# Patient Record
Sex: MALE | Race: WHITE | NOT HISPANIC OR LATINO | Employment: UNEMPLOYED | URBAN - METROPOLITAN AREA
[De-identification: names, ages, dates, MRNs, and addresses within clinical notes are randomized per-mention and may not be internally consistent; named-entity substitution may affect disease eponyms.]

---

## 2018-09-06 ENCOUNTER — PREP FOR SURGERY (OUTPATIENT)
Dept: OTHER | Facility: HOSPITAL | Age: 43
End: 2018-09-06

## 2018-09-06 ENCOUNTER — OFFICE VISIT (OUTPATIENT)
Dept: NEUROSURGERY | Facility: CLINIC | Age: 43
End: 2018-09-06

## 2018-09-06 VITALS
OXYGEN SATURATION: 99 % | BODY MASS INDEX: 32.93 KG/M2 | HEIGHT: 70 IN | RESPIRATION RATE: 18 BRPM | TEMPERATURE: 97.8 F | WEIGHT: 230 LBS

## 2018-09-06 DIAGNOSIS — M53.9 MULTILEVEL DEGENERATIVE DISC DISEASE: ICD-10-CM

## 2018-09-06 DIAGNOSIS — M51.36 BULGING LUMBAR DISC: Primary | ICD-10-CM

## 2018-09-06 DIAGNOSIS — M54.31 SCIATICA OF RIGHT SIDE: Primary | ICD-10-CM

## 2018-09-06 PROCEDURE — 99204 OFFICE O/P NEW MOD 45 MIN: CPT | Performed by: NEUROLOGICAL SURGERY

## 2018-09-06 RX ORDER — CEPHALEXIN 500 MG/1
CAPSULE ORAL
COMMUNITY
Start: 2018-08-29 | End: 2018-09-19 | Stop reason: HOSPADM

## 2018-09-06 RX ORDER — GABAPENTIN 300 MG/1
CAPSULE ORAL
Refills: 0 | COMMUNITY
Start: 2018-08-10 | End: 2018-11-20 | Stop reason: DRUGHIGH

## 2018-09-06 RX ORDER — CHLORHEXIDINE GLUCONATE 4 G/100ML
SOLUTION TOPICAL
Qty: 120 ML | Refills: 0 | Status: SHIPPED | OUTPATIENT
Start: 2018-09-06 | End: 2018-09-19 | Stop reason: HOSPADM

## 2018-09-06 RX ORDER — HYDROCODONE BITARTRATE AND ACETAMINOPHEN 7.5; 325 MG/1; MG/1
1 TABLET ORAL ONCE
Status: CANCELLED | OUTPATIENT
Start: 2018-09-06 | End: 2018-09-06

## 2018-09-06 RX ORDER — FAMOTIDINE 20 MG/1
20 TABLET, FILM COATED ORAL
Status: CANCELLED | OUTPATIENT
Start: 2018-09-06

## 2018-09-06 RX ORDER — OXYCODONE AND ACETAMINOPHEN 7.5; 325 MG/1; MG/1
TABLET ORAL
Refills: 0 | COMMUNITY
Start: 2018-08-10 | End: 2018-09-19 | Stop reason: HOSPADM

## 2018-09-06 RX ORDER — IBUPROFEN 800 MG/1
800 TABLET ORAL ONCE
Status: CANCELLED | OUTPATIENT
Start: 2018-09-06 | End: 2018-09-06

## 2018-09-06 RX ORDER — LISINOPRIL 20 MG/1
TABLET ORAL
COMMUNITY
Start: 2018-08-16

## 2018-09-06 RX ORDER — TOPIRAMATE 50 MG/1
50 TABLET, FILM COATED ORAL 2 TIMES DAILY
Refills: 5 | COMMUNITY
Start: 2018-08-10 | End: 2018-11-20

## 2018-09-06 RX ORDER — CEFAZOLIN SODIUM 2 G/100ML
2 INJECTION, SOLUTION INTRAVENOUS ONCE
Status: CANCELLED | OUTPATIENT
Start: 2018-09-06 | End: 2018-09-06

## 2018-09-06 RX ORDER — ACETAMINOPHEN 325 MG/1
650 TABLET ORAL ONCE
Status: CANCELLED | OUTPATIENT
Start: 2018-09-06 | End: 2018-09-06

## 2018-09-06 RX ORDER — DULOXETIN HYDROCHLORIDE 30 MG/1
CAPSULE, DELAYED RELEASE ORAL
COMMUNITY
Start: 2018-08-31 | End: 2018-11-20

## 2018-09-06 NOTE — PROGRESS NOTES
NAME: DEVON HERRMANN   DOS: 2018  : 1975  PCP: Carl Mercer MD    Chief Complaint:  Back pain and right thigh pain  Chief Complaint   Patient presents with   • Back Pain   • R-leg pain       History of Present Illness:  43 y.o. male   This is a 43-year-old with a bit of a complex history he has a history of being a  and presented with right-sided anterior thigh pain over a year and a half ago he's been on pain medicine prior to this for a history of chronic back pain.  He saw Dr. Morris to defer any surgical intervention he went and saw an interventional pain management and did injections that produce the symptoms of pain in the right anterior thigh and sciatica this pain was refractory for the course of months to years and then he eventually hooked up and got a lumbar spinal stimulator that provided temporary relief of his pain.  His pain then became worse in his right leg radiates in an L3 distribution and he had the stimulator removed followed by an appendectomy for a mass in his appendix.  He has current daily intractable pain it's in an L3 distribution and it's very consistent he's here for evaluation    PMHX  Allergies:  No Known Allergies  Medications    Current Outpatient Prescriptions:   •  cephalexin (KEFLEX) 500 MG capsule, , Disp: , Rfl:   •  DULoxetine (CYMBALTA) 30 MG capsule, , Disp: , Rfl:   •  gabapentin (NEURONTIN) 300 MG capsule, TAKE 2 CAPSULES BY MOUTH THREE TIMES DAILY LAST TILL 18, Disp: , Rfl: 0  •  lisinopril (PRINIVIL,ZESTRIL) 20 MG tablet, , Disp: , Rfl:   •  oxyCODONE-acetaminophen (PERCOCET) 7.5-325 MG per tablet, TAKE 1 TABLET BY MOUTH THREE TIMES DAILY LAST TILL 18, Disp: , Rfl: 0  •  topiramate (TOPAMAX) 50 MG tablet, Take 50 mg by mouth 2 (Two) Times a Day., Disp: , Rfl: 5  Past Medical History:  Past Medical History:   Diagnosis Date   • Hypertension    • Low back pain      Past Surgical History:  Past Surgical History:   Procedure  Laterality Date   • APPENDECTOMY  2018   • SHOULDER ARTHROSCOPY Left 2015   • SPINAL CORD STIMULATOR IMPLANT  2016    Removed 2018 Dr. Cyril Harman     Social Hx:  Social History   Substance Use Topics   • Smoking status: Never Smoker   • Smokeless tobacco: Never Used   • Alcohol use No     Family Hx:  Family History   Problem Relation Age of Onset   • Family history unknown: Yes     Review of Systems:        Review of Systems   Constitutional: Positive for activity change, appetite change and fatigue. Negative for chills, diaphoresis, fever and unexpected weight change.   HENT: Negative for congestion, dental problem, drooling, ear discharge, ear pain, facial swelling, hearing loss, mouth sores, nosebleeds, postnasal drip, rhinorrhea, sinus pressure, sneezing, sore throat, tinnitus, trouble swallowing and voice change.    Eyes: Positive for visual disturbance. Negative for photophobia, pain, discharge, redness and itching.   Respiratory: Negative for apnea, cough, choking, chest tightness, shortness of breath, wheezing and stridor.    Cardiovascular: Negative for chest pain, palpitations and leg swelling.   Gastrointestinal: Positive for diarrhea. Negative for abdominal distention, abdominal pain, anal bleeding, blood in stool, constipation, nausea, rectal pain and vomiting.   Endocrine: Negative for cold intolerance, heat intolerance, polydipsia, polyphagia and polyuria.   Genitourinary: Negative for decreased urine volume, difficulty urinating, dysuria, enuresis, flank pain, frequency, genital sores, hematuria and urgency.   Musculoskeletal: Positive for arthralgias, back pain, myalgias and neck pain. Negative for gait problem, joint swelling and neck stiffness.   Skin: Negative for color change, pallor, rash and wound.   Allergic/Immunologic: Negative for environmental allergies, food allergies and immunocompromised state.   Neurological: Positive for weakness and numbness. Negative for dizziness, tremors,  seizures, syncope, facial asymmetry, speech difficulty, light-headedness and headaches.   Hematological: Negative for adenopathy. Does not bruise/bleed easily.   Psychiatric/Behavioral: Positive for sleep disturbance. Negative for agitation, behavioral problems, confusion, decreased concentration, dysphoric mood, hallucinations, self-injury and suicidal ideas. The patient is not nervous/anxious and is not hyperactive.    All other systems reviewed and are negative.       I have reviewed this note template and all pertinent parts of the review of systems social, family history, surgical history and medication list    Physical Examination:  Vitals:    09/06/18 1352   Resp: 18   Temp: 97.8 °F (36.6 °C)   SpO2: 99%      General Appearance:   Well developed, well nourished, well groomed, alert, and cooperative.  He appears very uncomfortable  Neurological examination:  Neurologic Exam  Vital signs were reviewed and documented in the chart  Patient appeared in good neurologic function with normal comprehension fluent speech  Mood and affect are normal  Sense of smell deferred    Pupils symmetric equally reactive funduscopic exam not visualized   Visual fields intact to confrontation  Extraocular movements intact  Face motor function is symmetric  Facial sensations normal  Hearing intact to finger rub hearing intact to finger rub  Tongue is midline  Palate symmetric  Swallowing normal  Shoulder shrug normal    Muscle bulk and tone normal  5 out of 5 strength no motor drift  Gait normal intact pain limited  Negative Romberg  No clonus long tract signs or myelopathy  He is very uncomfortable  Reflexes symmetric  No edema noted and extremities skin appears normal  Decreased light touch in the right anterior thigh  Straight leg raise sign positive on the right  No signs of intrinsic hip dysfunction  Back is without any lesions or abnormality  Feet are warm and well perfused        Review of Imaging/DATA:  Reviewed his MRIs  very carefully he has a far lateral disc at L3 4 he's got multilevel degenerative disc disease of thoracic spine shows a central disc bulge  Diagnoses/Plan:    Mr. Unger is a 43 y.o. male   I discussed within the treatment options I Splane the risks benefits and etc. I think he needs exploration of his right L3 nerve from a far lateral approach I explained the risks benefits and more importantly I tempered expectations for an excellent outcome explaining treatment for chronic sciatic pain is difficult.  The plan will be that surgery is ineffective in alleviating his thigh pain we will contemplate placement of a paddle stimulator at a different time I explained the risks benefits and expected outcome we will make arrangements for right L3 4 far lateral discectomy

## 2018-09-07 PROBLEM — M54.31 SCIATICA OF RIGHT SIDE: Status: ACTIVE | Noted: 2018-09-07

## 2018-09-17 ENCOUNTER — APPOINTMENT (OUTPATIENT)
Dept: PREADMISSION TESTING | Facility: HOSPITAL | Age: 43
End: 2018-09-17

## 2018-09-17 VITALS — BODY MASS INDEX: 33.14 KG/M2 | WEIGHT: 223.77 LBS | HEIGHT: 69 IN

## 2018-09-17 DIAGNOSIS — M54.31 SCIATICA OF RIGHT SIDE: ICD-10-CM

## 2018-09-17 LAB
ANION GAP SERPL CALCULATED.3IONS-SCNC: 11 MMOL/L (ref 3–11)
BUN BLD-MCNC: 14 MG/DL (ref 9–23)
BUN/CREAT SERPL: 18.2 (ref 7–25)
CALCIUM SPEC-SCNC: 9.8 MG/DL (ref 8.7–10.4)
CHLORIDE SERPL-SCNC: 105 MMOL/L (ref 99–109)
CO2 SERPL-SCNC: 24 MMOL/L (ref 20–31)
CREAT BLD-MCNC: 0.77 MG/DL (ref 0.6–1.3)
DEPRECATED RDW RBC AUTO: 41.1 FL (ref 37–54)
ERYTHROCYTE [DISTWIDTH] IN BLOOD BY AUTOMATED COUNT: 13 % (ref 11.3–14.5)
GFR SERPL CREATININE-BSD FRML MDRD: 110 ML/MIN/1.73
GLUCOSE BLD-MCNC: 122 MG/DL (ref 70–100)
HCT VFR BLD AUTO: 44.8 % (ref 38.9–50.9)
HGB BLD-MCNC: 15.8 G/DL (ref 13.1–17.5)
MCH RBC QN AUTO: 30.4 PG (ref 27–31)
MCHC RBC AUTO-ENTMCNC: 35.3 G/DL (ref 32–36)
MCV RBC AUTO: 86.2 FL (ref 80–99)
MRSA DNA SPEC QL NAA+PROBE: NEGATIVE
PLATELET # BLD AUTO: 293 10*3/MM3 (ref 150–450)
PMV BLD AUTO: 10.1 FL (ref 6–12)
POTASSIUM BLD-SCNC: 4.2 MMOL/L (ref 3.5–5.5)
RBC # BLD AUTO: 5.2 10*6/MM3 (ref 4.2–5.76)
SODIUM BLD-SCNC: 140 MMOL/L (ref 132–146)
WBC NRBC COR # BLD: 10.39 10*3/MM3 (ref 3.5–10.8)

## 2018-09-17 PROCEDURE — 93005 ELECTROCARDIOGRAM TRACING: CPT

## 2018-09-17 PROCEDURE — 93010 ELECTROCARDIOGRAM REPORT: CPT | Performed by: INTERNAL MEDICINE

## 2018-09-17 PROCEDURE — 85027 COMPLETE CBC AUTOMATED: CPT | Performed by: ANESTHESIOLOGY

## 2018-09-17 PROCEDURE — 87641 MR-STAPH DNA AMP PROBE: CPT | Performed by: ANESTHESIOLOGY

## 2018-09-17 PROCEDURE — 36415 COLL VENOUS BLD VENIPUNCTURE: CPT

## 2018-09-17 PROCEDURE — 80048 BASIC METABOLIC PNL TOTAL CA: CPT | Performed by: NEUROLOGICAL SURGERY

## 2018-09-17 RX ORDER — DULOXETIN HYDROCHLORIDE 60 MG/1
60 CAPSULE, DELAYED RELEASE ORAL NIGHTLY
COMMUNITY

## 2018-09-17 RX ORDER — GABAPENTIN 400 MG/1
400 CAPSULE ORAL NIGHTLY
COMMUNITY
End: 2018-11-20 | Stop reason: DRUGHIGH

## 2018-09-17 RX ORDER — TOPIRAMATE 100 MG/1
100 TABLET, FILM COATED ORAL NIGHTLY
COMMUNITY

## 2018-09-17 RX ORDER — OXYCODONE AND ACETAMINOPHEN 10; 325 MG/1; MG/1
1 TABLET ORAL EVERY 6 HOURS PRN
COMMUNITY
End: 2019-01-14

## 2018-09-18 ENCOUNTER — ANESTHESIA EVENT (OUTPATIENT)
Dept: PERIOP | Facility: HOSPITAL | Age: 43
End: 2018-09-18

## 2018-09-18 RX ORDER — FAMOTIDINE 20 MG/1
20 TABLET, FILM COATED ORAL
Status: CANCELLED | OUTPATIENT
Start: 2018-09-18

## 2018-09-19 ENCOUNTER — ANESTHESIA (OUTPATIENT)
Dept: PERIOP | Facility: HOSPITAL | Age: 43
End: 2018-09-19

## 2018-09-19 ENCOUNTER — APPOINTMENT (OUTPATIENT)
Dept: GENERAL RADIOLOGY | Facility: HOSPITAL | Age: 43
End: 2018-09-19

## 2018-09-19 ENCOUNTER — HOSPITAL ENCOUNTER (OUTPATIENT)
Facility: HOSPITAL | Age: 43
Discharge: HOME | End: 2018-09-19
Attending: NEUROLOGICAL SURGERY | Admitting: ANESTHESIOLOGY

## 2018-09-19 VITALS
HEIGHT: 69 IN | HEART RATE: 76 BPM | RESPIRATION RATE: 18 BRPM | DIASTOLIC BLOOD PRESSURE: 78 MMHG | OXYGEN SATURATION: 95 % | BODY MASS INDEX: 33.03 KG/M2 | WEIGHT: 223 LBS | SYSTOLIC BLOOD PRESSURE: 108 MMHG | TEMPERATURE: 97.3 F

## 2018-09-19 DIAGNOSIS — M54.31 SCIATICA OF RIGHT SIDE: ICD-10-CM

## 2018-09-19 PROBLEM — M54.30 SCIATICA: Status: ACTIVE | Noted: 2018-09-19

## 2018-09-19 PROCEDURE — 25010000002 ONDANSETRON PER 1 MG: Performed by: NURSE ANESTHETIST, CERTIFIED REGISTERED

## 2018-09-19 PROCEDURE — 25010000002 FENTANYL CITRATE (PF) 100 MCG/2ML SOLUTION: Performed by: NURSE ANESTHETIST, CERTIFIED REGISTERED

## 2018-09-19 PROCEDURE — 63056 DECOMPRESS SPINAL CORD LMBR: CPT | Performed by: NEUROLOGICAL SURGERY

## 2018-09-19 PROCEDURE — 25010000002 DEXAMETHASONE SODIUM PHOSPHATE 10 MG/ML SOLUTION: Performed by: NEUROLOGICAL SURGERY

## 2018-09-19 PROCEDURE — 25010000002 MIDAZOLAM PER 1 MG: Performed by: NURSE ANESTHETIST, CERTIFIED REGISTERED

## 2018-09-19 PROCEDURE — 25010000002 PHENYLEPHRINE PER 1 ML: Performed by: NURSE ANESTHETIST, CERTIFIED REGISTERED

## 2018-09-19 PROCEDURE — 25010000002 DEXAMETHASONE PER 1 MG: Performed by: NURSE ANESTHETIST, CERTIFIED REGISTERED

## 2018-09-19 PROCEDURE — 63056 DECOMPRESS SPINAL CORD LMBR: CPT | Performed by: PHYSICIAN ASSISTANT

## 2018-09-19 PROCEDURE — 25010000002 DEXAMETHASONE SODIUM PHOSPHATE 10 MG/ML SOLUTION 1 ML VIAL: Performed by: NURSE ANESTHETIST, CERTIFIED REGISTERED

## 2018-09-19 PROCEDURE — 25010000002 PROPOFOL 10 MG/ML EMULSION: Performed by: NURSE ANESTHETIST, CERTIFIED REGISTERED

## 2018-09-19 PROCEDURE — 76001 HC FLUORO GREATER THAN 1 HOUR: CPT

## 2018-09-19 PROCEDURE — 25010000002 HYDROMORPHONE PER 4 MG: Performed by: NURSE ANESTHETIST, CERTIFIED REGISTERED

## 2018-09-19 PROCEDURE — 76000 FLUOROSCOPY <1 HR PHYS/QHP: CPT

## 2018-09-19 PROCEDURE — 25010000003 CEFAZOLIN IN DEXTROSE 2-4 GM/100ML-% SOLUTION: Performed by: NEUROLOGICAL SURGERY

## 2018-09-19 PROCEDURE — 25010000002 NEOSTIGMINE 10 MG/10ML SOLUTION: Performed by: NURSE ANESTHETIST, CERTIFIED REGISTERED

## 2018-09-19 PROCEDURE — 25010000002 BUPRENORPHINE PER 0.1 MG: Performed by: NURSE ANESTHETIST, CERTIFIED REGISTERED

## 2018-09-19 RX ORDER — FENTANYL CITRATE 50 UG/ML
INJECTION, SOLUTION INTRAMUSCULAR; INTRAVENOUS AS NEEDED
Status: DISCONTINUED | OUTPATIENT
Start: 2018-09-19 | End: 2018-09-19 | Stop reason: SURG

## 2018-09-19 RX ORDER — ACETAMINOPHEN 325 MG/1
650 TABLET ORAL ONCE
Status: COMPLETED | OUTPATIENT
Start: 2018-09-19 | End: 2018-09-19

## 2018-09-19 RX ORDER — CEFAZOLIN SODIUM 2 G/100ML
2 INJECTION, SOLUTION INTRAVENOUS ONCE
Status: COMPLETED | OUTPATIENT
Start: 2018-09-19 | End: 2018-09-19

## 2018-09-19 RX ORDER — DEXAMETHASONE SODIUM PHOSPHATE 10 MG/ML
INJECTION INTRAMUSCULAR; INTRAVENOUS AS NEEDED
Status: DISCONTINUED | OUTPATIENT
Start: 2018-09-19 | End: 2018-09-19 | Stop reason: SURG

## 2018-09-19 RX ORDER — HYDROMORPHONE HYDROCHLORIDE 1 MG/ML
0.5 INJECTION, SOLUTION INTRAMUSCULAR; INTRAVENOUS; SUBCUTANEOUS
Status: DISCONTINUED | OUTPATIENT
Start: 2018-09-19 | End: 2018-09-19 | Stop reason: HOSPADM

## 2018-09-19 RX ORDER — FAMOTIDINE 20 MG/1
20 TABLET, FILM COATED ORAL
Status: COMPLETED | OUTPATIENT
Start: 2018-09-19 | End: 2018-09-19

## 2018-09-19 RX ORDER — METHOCARBAMOL 500 MG/1
750 TABLET, FILM COATED ORAL 3 TIMES DAILY PRN
Qty: 135 TABLET | Refills: 0 | Status: SHIPPED | OUTPATIENT
Start: 2018-09-19

## 2018-09-19 RX ORDER — ONDANSETRON 2 MG/ML
INJECTION INTRAMUSCULAR; INTRAVENOUS AS NEEDED
Status: DISCONTINUED | OUTPATIENT
Start: 2018-09-19 | End: 2018-09-19 | Stop reason: SURG

## 2018-09-19 RX ORDER — SODIUM CHLORIDE 0.9 % (FLUSH) 0.9 %
1-10 SYRINGE (ML) INJECTION AS NEEDED
Status: DISCONTINUED | OUTPATIENT
Start: 2018-09-19 | End: 2018-09-19 | Stop reason: HOSPADM

## 2018-09-19 RX ORDER — SODIUM CHLORIDE, SODIUM LACTATE, POTASSIUM CHLORIDE, CALCIUM CHLORIDE 600; 310; 30; 20 MG/100ML; MG/100ML; MG/100ML; MG/100ML
9 INJECTION, SOLUTION INTRAVENOUS CONTINUOUS PRN
Status: DISCONTINUED | OUTPATIENT
Start: 2018-09-19 | End: 2018-09-19 | Stop reason: HOSPADM

## 2018-09-19 RX ORDER — FENTANYL CITRATE 50 UG/ML
50 INJECTION, SOLUTION INTRAMUSCULAR; INTRAVENOUS
Status: DISCONTINUED | OUTPATIENT
Start: 2018-09-19 | End: 2018-09-19 | Stop reason: HOSPADM

## 2018-09-19 RX ORDER — DEXAMETHASONE SODIUM PHOSPHATE 4 MG/ML
8 VIAL (ML) INJECTION ONCE AS NEEDED
Status: DISCONTINUED | OUTPATIENT
Start: 2018-09-19 | End: 2018-09-19 | Stop reason: HOSPADM

## 2018-09-19 RX ORDER — HYDROCODONE BITARTRATE AND ACETAMINOPHEN 7.5; 325 MG/1; MG/1
1 TABLET ORAL ONCE
Status: COMPLETED | OUTPATIENT
Start: 2018-09-19 | End: 2018-09-19

## 2018-09-19 RX ORDER — ONDANSETRON 2 MG/ML
4 INJECTION INTRAMUSCULAR; INTRAVENOUS ONCE AS NEEDED
Status: DISCONTINUED | OUTPATIENT
Start: 2018-09-19 | End: 2018-09-19 | Stop reason: HOSPADM

## 2018-09-19 RX ORDER — OXYCODONE AND ACETAMINOPHEN 10; 325 MG/1; MG/1
1 TABLET ORAL ONCE AS NEEDED
Status: DISCONTINUED | OUTPATIENT
Start: 2018-09-19 | End: 2018-09-19 | Stop reason: HOSPADM

## 2018-09-19 RX ORDER — DEXAMETHASONE SODIUM PHOSPHATE 10 MG/ML
INJECTION, SOLUTION INTRAMUSCULAR; INTRAVENOUS AS NEEDED
Status: DISCONTINUED | OUTPATIENT
Start: 2018-09-19 | End: 2018-09-19 | Stop reason: HOSPADM

## 2018-09-19 RX ORDER — MIDAZOLAM HYDROCHLORIDE 1 MG/ML
INJECTION INTRAMUSCULAR; INTRAVENOUS AS NEEDED
Status: DISCONTINUED | OUTPATIENT
Start: 2018-09-19 | End: 2018-09-19 | Stop reason: SURG

## 2018-09-19 RX ORDER — ROCURONIUM BROMIDE 10 MG/ML
INJECTION, SOLUTION INTRAVENOUS AS NEEDED
Status: DISCONTINUED | OUTPATIENT
Start: 2018-09-19 | End: 2018-09-19 | Stop reason: SURG

## 2018-09-19 RX ORDER — NEOSTIGMINE METHYLSULFATE 1 MG/ML
INJECTION, SOLUTION INTRAVENOUS AS NEEDED
Status: DISCONTINUED | OUTPATIENT
Start: 2018-09-19 | End: 2018-09-19 | Stop reason: SURG

## 2018-09-19 RX ORDER — OXYCODONE HYDROCHLORIDE AND ACETAMINOPHEN 5; 325 MG/1; MG/1
1 TABLET ORAL EVERY 6 HOURS PRN
Qty: 40 TABLET | Refills: 0 | Status: SHIPPED | OUTPATIENT
Start: 2018-09-19 | End: 2018-11-20

## 2018-09-19 RX ORDER — IBUPROFEN 800 MG/1
800 TABLET ORAL ONCE
Status: COMPLETED | OUTPATIENT
Start: 2018-09-19 | End: 2018-09-19

## 2018-09-19 RX ORDER — PROPOFOL 10 MG/ML
VIAL (ML) INTRAVENOUS AS NEEDED
Status: DISCONTINUED | OUTPATIENT
Start: 2018-09-19 | End: 2018-09-19 | Stop reason: SURG

## 2018-09-19 RX ORDER — LIDOCAINE HYDROCHLORIDE 20 MG/ML
INJECTION, SOLUTION INFILTRATION; PERINEURAL AS NEEDED
Status: DISCONTINUED | OUTPATIENT
Start: 2018-09-19 | End: 2018-09-19 | Stop reason: SURG

## 2018-09-19 RX ORDER — OXYCODONE HYDROCHLORIDE AND ACETAMINOPHEN 5; 325 MG/1; MG/1
1 TABLET ORAL EVERY 4 HOURS PRN
Qty: 40 TABLET | Refills: 0 | Status: SHIPPED | OUTPATIENT
Start: 2018-09-19 | End: 2018-11-20

## 2018-09-19 RX ORDER — MAGNESIUM HYDROXIDE 1200 MG/15ML
LIQUID ORAL AS NEEDED
Status: DISCONTINUED | OUTPATIENT
Start: 2018-09-19 | End: 2018-09-19 | Stop reason: HOSPADM

## 2018-09-19 RX ORDER — LIDOCAINE HYDROCHLORIDE 10 MG/ML
0.5 INJECTION, SOLUTION EPIDURAL; INFILTRATION; INTRACAUDAL; PERINEURAL ONCE AS NEEDED
Status: COMPLETED | OUTPATIENT
Start: 2018-09-19 | End: 2018-09-19

## 2018-09-19 RX ORDER — GLYCOPYRROLATE 0.2 MG/ML
INJECTION INTRAMUSCULAR; INTRAVENOUS AS NEEDED
Status: DISCONTINUED | OUTPATIENT
Start: 2018-09-19 | End: 2018-09-19 | Stop reason: SURG

## 2018-09-19 RX ADMIN — FENTANYL CITRATE 100 MCG: 50 INJECTION, SOLUTION INTRAMUSCULAR; INTRAVENOUS at 14:55

## 2018-09-19 RX ADMIN — DEXAMETHASONE SODIUM PHOSPHATE 20 ML: 10 INJECTION, SOLUTION INTRAMUSCULAR; INTRAVENOUS at 13:30

## 2018-09-19 RX ADMIN — HYDROCODONE BITARTRATE AND ACETAMINOPHEN 1 TABLET: 7.5; 325 TABLET ORAL at 11:53

## 2018-09-19 RX ADMIN — MIDAZOLAM HYDROCHLORIDE 2 MG: 1 INJECTION, SOLUTION INTRAMUSCULAR; INTRAVENOUS at 13:12

## 2018-09-19 RX ADMIN — LIDOCAINE HYDROCHLORIDE 0.5 ML: 10 INJECTION, SOLUTION EPIDURAL; INFILTRATION; INTRACAUDAL; PERINEURAL at 11:53

## 2018-09-19 RX ADMIN — FENTANYL CITRATE: 50 INJECTION INTRAMUSCULAR; INTRAVENOUS at 15:46

## 2018-09-19 RX ADMIN — PHENYLEPHRINE HYDROCHLORIDE 100 MCG: 10 INJECTION INTRAVENOUS at 14:05

## 2018-09-19 RX ADMIN — PHENYLEPHRINE HYDROCHLORIDE 200 MCG: 10 INJECTION INTRAVENOUS at 13:47

## 2018-09-19 RX ADMIN — IBUPROFEN 800 MG: 800 TABLET ORAL at 11:53

## 2018-09-19 RX ADMIN — ACETAMINOPHEN 650 MG: 325 TABLET ORAL at 11:53

## 2018-09-19 RX ADMIN — HYDROMORPHONE HYDROCHLORIDE: 1 INJECTION, SOLUTION INTRAMUSCULAR; INTRAVENOUS; SUBCUTANEOUS at 15:22

## 2018-09-19 RX ADMIN — CEFAZOLIN SODIUM 2 G: 2 INJECTION, SOLUTION INTRAVENOUS at 13:12

## 2018-09-19 RX ADMIN — LIDOCAINE HYDROCHLORIDE 50 MG: 20 INJECTION, SOLUTION INFILTRATION; PERINEURAL at 13:15

## 2018-09-19 RX ADMIN — FAMOTIDINE 20 MG: 20 TABLET ORAL at 11:53

## 2018-09-19 RX ADMIN — PROPOFOL 250 MG: 10 INJECTION, EMULSION INTRAVENOUS at 13:16

## 2018-09-19 RX ADMIN — PHENYLEPHRINE HYDROCHLORIDE 100 MCG: 10 INJECTION INTRAVENOUS at 14:40

## 2018-09-19 RX ADMIN — FENTANYL CITRATE 150 MCG: 50 INJECTION, SOLUTION INTRAMUSCULAR; INTRAVENOUS at 14:45

## 2018-09-19 RX ADMIN — PHENYLEPHRINE HYDROCHLORIDE 100 MCG: 10 INJECTION INTRAVENOUS at 14:00

## 2018-09-19 RX ADMIN — NEOSTIGMINE METHYLSULFATE 3 MG: 1 INJECTION, SOLUTION INTRAVENOUS at 14:38

## 2018-09-19 RX ADMIN — SODIUM CHLORIDE, POTASSIUM CHLORIDE, SODIUM LACTATE AND CALCIUM CHLORIDE: 600; 310; 30; 20 INJECTION, SOLUTION INTRAVENOUS at 14:33

## 2018-09-19 RX ADMIN — GLYCOPYRROLATE 0.4 MG: 0.2 INJECTION, SOLUTION INTRAMUSCULAR; INTRAVENOUS at 14:38

## 2018-09-19 RX ADMIN — ONDANSETRON 4 MG: 2 INJECTION INTRAMUSCULAR; INTRAVENOUS at 14:36

## 2018-09-19 RX ADMIN — ROCURONIUM BROMIDE 50 MG: 10 SOLUTION INTRAVENOUS at 13:16

## 2018-09-19 RX ADMIN — DEXAMETHASONE SODIUM PHOSPHATE 8 MG: 10 INJECTION INTRAMUSCULAR; INTRAVENOUS at 13:49

## 2018-09-19 RX ADMIN — SODIUM CHLORIDE, POTASSIUM CHLORIDE, SODIUM LACTATE AND CALCIUM CHLORIDE 9 ML/HR: 600; 310; 30; 20 INJECTION, SOLUTION INTRAVENOUS at 11:53

## 2018-09-19 RX ADMIN — HYDROMORPHONE HYDROCHLORIDE: 1 INJECTION, SOLUTION INTRAMUSCULAR; INTRAVENOUS; SUBCUTANEOUS at 15:08

## 2018-09-19 NOTE — ANESTHESIA PROCEDURE NOTES
Airway  Urgency: elective    Airway not difficult    General Information and Staff    Patient location during procedure: OR    Indications and Patient Condition  Indications for airway management: airway protection    Preoxygenated: yes  Mask difficulty assessment: 1 - vent by mask    Final Airway Details  Final airway type: endotracheal airway      Successful airway: ETT    Successful intubation technique: direct laryngoscopy  Facilitating devices/methods: intubating stylet  Blade: Shannon  ETT size: 8.0 mm  Cormack-Lehane Classification: grade I - full view of glottis  Placement verified by: chest auscultation and capnometry   Measured from: lips  ETT to lips (cm): 22

## 2018-09-19 NOTE — ANESTHESIA POSTPROCEDURE EVALUATION
Patient: Romain Unger    Procedure Summary     Date:  09/19/18 Room / Location:   [facility] /  [facility]    Anesthesia Start:  1312 Anesthesia Stop:  1455    Procedure:  LUMBAR MICRO-DISECTOMY RIGHT L3-4 FAR LATERAL (Right Spine Lumbar) Diagnosis:       Sciatica of right side      (Sciatica of right side [M54.31])    Surgeon:  Beto Castillo MD Provider:  Klever Walsh MD    Anesthesia Type:  general ASA Status:  2          Anesthesia Type: general  Last vitals  BP   115/64 (09/19/18 1455)   Temp   98 °F (36.7 °C) (09/19/18 1455)   Pulse   81 (09/19/18 1455)   Resp   18 (09/19/18 1455)     SpO2   97 % (09/19/18 1455)     Post Anesthesia Care and Evaluation    Patient location during evaluation: PACU  Patient participation: complete - patient participated  Level of consciousness: awake and alert  Pain management: adequate  Airway patency: patent  Anesthetic complications: No anesthetic complications  PONV Status: none  Cardiovascular status: acceptable  Respiratory status: acceptable  Hydration status: acceptable

## 2018-09-19 NOTE — ANESTHESIA PREPROCEDURE EVALUATION
Anesthesia Evaluation     Patient summary reviewed and Nursing notes reviewed   NPO Solid Status: > 8 hours  NPO Liquid Status: > 8 hours           Airway   Mallampati: III  TM distance: >3 FB  Neck ROM: full  Possible difficult intubation  Dental      Pulmonary    (+) a smoker Current,   (-) COPD, asthma, shortness of breath, recent URI, sleep apnea, lung cancer  Cardiovascular     ECG reviewed    (+) hypertension,   (-) past MI, CAD, dysrhythmias, angina, cardiac stents, CABG, hyperlipidemia    ROS comment: EKG NSR     Neuro/Psych  (+) numbness,     (-) seizures, TIA, CVA    ROS Comment: Sp spinal cord stimulator   GI/Hepatic/Renal/Endo    (-) liver disease, no renal disease, diabetes, hypothyroidism    Musculoskeletal     Abdominal    Substance History      OB/GYN          Other        ROS/Med Hx Other: Oxycodone  Hi BSL       Phys Exam Other: Full beard                 Anesthesia Plan    ASA 2     general   (Glidescope SB )  intravenous induction   Anesthetic plan, all risks, benefits, and alternatives have been provided, discussed and informed consent has been obtained with: patient.    Plan discussed with CRNA.

## 2018-10-11 ENCOUNTER — OFFICE VISIT (OUTPATIENT)
Dept: NEUROSURGERY | Facility: CLINIC | Age: 43
End: 2018-10-11

## 2018-10-11 VITALS
WEIGHT: 232 LBS | BODY MASS INDEX: 34.36 KG/M2 | HEIGHT: 69 IN | DIASTOLIC BLOOD PRESSURE: 88 MMHG | TEMPERATURE: 98.2 F | SYSTOLIC BLOOD PRESSURE: 132 MMHG

## 2018-10-11 DIAGNOSIS — M54.31 SCIATICA OF RIGHT SIDE: Primary | ICD-10-CM

## 2018-10-11 PROCEDURE — 99024 POSTOP FOLLOW-UP VISIT: CPT | Performed by: PHYSICIAN ASSISTANT

## 2018-10-11 NOTE — PROGRESS NOTES
Subjective   Patient ID: Romain Unger is a 43 y.o. male is here today for follow-up for wound check.    HPI:      This is a 43-year-old with a bit of a complex history he has a history of being a  and presented with right-sided anterior thigh pain over a year and a half ago he's been on pain medicine prior to this for a history of chronic back pain.  He saw Dr. Morris to defer any surgical intervention he went and saw an interventional pain management and did injections that produce the symptoms of pain in the right anterior thigh and sciatica this pain was refractory for the course of months to years and then he eventually hooked up and got a lumbar spinal stimulator that provided temporary relief of his pain.  His pain then became worse in his right leg radiates in an L3 distribution and he had the stimulator removed followed by an appendectomy for a mass in his appendix.    Patient was deemed surgical candidate by Dr. Beto Castillo and patient underwent a right-sided L3 4 far lateral microdiscectomy on 9/19/2018.  Patient has had symptoms significant relief and is now able to walk.  Patient was initially brought in a wheelchair due to the exquisite pain.  Patient states that 65-70% pain is gone and it only hurts him intermittently.  Patient describes them as phantom pains that come and go.    The incision today looks to be well-healed, well approximated.  No signs of infection, bleeding, or erythema.    The following portions of the patient's history were reviewed and updated as appropriate: allergies, current medications, past family history, past medical history, past social history, past surgical history and problem list.    Review of Systems   Constitutional: Positive for activity change, chills and fatigue. Negative for appetite change, diaphoresis, fever and unexpected weight change.   HENT: Negative for congestion, dental problem, drooling, ear discharge, ear pain, facial swelling, hearing  loss, mouth sores, nosebleeds, postnasal drip, rhinorrhea, sinus pressure, sneezing, sore throat, tinnitus, trouble swallowing and voice change.    Eyes: Negative for photophobia, pain, discharge, redness, itching and visual disturbance.   Respiratory: Negative for apnea, cough, choking, chest tightness, shortness of breath, wheezing and stridor.    Cardiovascular: Negative for chest pain, palpitations and leg swelling.   Gastrointestinal: Positive for diarrhea. Negative for abdominal distention, abdominal pain, anal bleeding, blood in stool, constipation, nausea, rectal pain and vomiting.   Endocrine: Negative for cold intolerance, heat intolerance, polydipsia, polyphagia and polyuria.   Genitourinary: Negative for decreased urine volume, difficulty urinating, dysuria, enuresis, flank pain, frequency, genital sores, hematuria and urgency.   Musculoskeletal: Positive for arthralgias, back pain, neck pain and neck stiffness. Negative for gait problem, joint swelling and myalgias.   Skin: Negative for color change, pallor, rash and wound.   Allergic/Immunologic: Negative for environmental allergies, food allergies and immunocompromised state.   Neurological: Positive for weakness and numbness. Negative for dizziness, tremors, seizures, syncope, facial asymmetry, speech difficulty, light-headedness and headaches.   Hematological: Negative for adenopathy. Does not bruise/bleed easily.   Psychiatric/Behavioral: Positive for dysphoric mood and sleep disturbance. Negative for agitation, behavioral problems, confusion, decreased concentration, hallucinations, self-injury and suicidal ideas. The patient is nervous/anxious. The patient is not hyperactive.          Objective    reports that he has been smoking Cigarettes.  He has a 10.00 pack-year smoking history. He has never used smokeless tobacco. He reports that he does not drink alcohol or use drugs.   SMOKING STATUS: Nonsmoker    Physical Exam:   Vitals:/88 (BP  "Location: Right arm)   Temp 98.2 °F (36.8 °C) (Temporal Artery )   Ht 175.3 cm (69\")   Wt 105 kg (232 lb)   BMI 34.26 kg/m²    BMI: Body mass index is 34.26 kg/m².     GENEREAL:   The patient is in no acute distress, and is able to answer all questions appropriately.  Skin:  The incision is well-healed and well approximated.  No signs of infection, bleeding, or erythema.  Musculoskeletal: The patient’s strength is intact in upper and lower extremities upon direct testing.  Dorsiflexion and plantarflexion are equal bilaterally @ 5/5. Hip flexion against resistance.  The patient’s gait is normal without antalgia.  Neurologic: The patient is alert and oriented by 3.  Recent memory, language, attention span, and fund of knowledge are all with within normal limits.   Sensation is equal bilaterally with no deficit.    Reflexes are 2+ at the patellar and Achilles tendon bilaterally.      Assessment/Plan   Independent Review of Radiographic Studies:    No new films were reviewed at this visit  Medical Decision Making:    Patient is doing very well and is having a good course of recovery.  Patient is pleased with postsurgical outcome and has had significant reduction in his pain.  Unfortunately, he is been dealing with this disc for close to 2 years and has not had the rapid rebound that we would typically like to see.  I'm going to put him into physical therapy and see him back in about 6 weeks to check on his progress.    It has been a pleasure providing neurosurgical care.    Bunny Garces PA-C  There are no diagnoses linked to this encounter.  No Follow-up on file.             "

## 2018-10-18 ENCOUNTER — TELEPHONE (OUTPATIENT)
Dept: NEUROSURGERY | Facility: CLINIC | Age: 43
End: 2018-10-18

## 2018-10-18 NOTE — TELEPHONE ENCOUNTER
Called pt and explained restrictions- he verbalized understanding.  He requested RTW note be mailed. Letter has been mailed.  Closing encounter.

## 2018-10-18 NOTE — TELEPHONE ENCOUNTER
Per Bunny Garces- pt can RTW with the following restrictions    No bending, lifting, twisting and pt will need to exit the vehicle very cautiously (no jumping out of the truck).     Attempted to contact pt to advise him of this, LVM for him to call us back.  Note has been written and is in chart.

## 2018-10-18 NOTE — TELEPHONE ENCOUNTER
Provider:  Jonathan  Caller: pt  Time of call:   5pm  Phone #:     Surgery: LUMBAR MICRO-DISECTOMY RIGHT L3-4 FAR LATERAL   Surgery Date: 09/19/18    Last visit:   10/11/18  Next visit: 11/19/18    Reason for call:         Pt left msg wanting a return to work slip.  Says he can  in office today.  The sooner he can return to work, the better.      If this is appropriate please let pt know when note is ready for .

## 2018-11-20 ENCOUNTER — OFFICE VISIT (OUTPATIENT)
Dept: NEUROSURGERY | Facility: CLINIC | Age: 43
End: 2018-11-20

## 2018-11-20 VITALS
BODY MASS INDEX: 34.96 KG/M2 | HEIGHT: 69 IN | SYSTOLIC BLOOD PRESSURE: 140 MMHG | DIASTOLIC BLOOD PRESSURE: 80 MMHG | WEIGHT: 236 LBS | TEMPERATURE: 98 F

## 2018-11-20 DIAGNOSIS — M51.26 HNP (HERNIATED NUCLEUS PULPOSUS), LUMBAR: ICD-10-CM

## 2018-11-20 DIAGNOSIS — Z98.890 S/P LUMBAR DISCECTOMY: Primary | ICD-10-CM

## 2018-11-20 PROCEDURE — 99024 POSTOP FOLLOW-UP VISIT: CPT | Performed by: PHYSICIAN ASSISTANT

## 2018-11-20 RX ORDER — GABAPENTIN 600 MG/1
TABLET ORAL
COMMUNITY
Start: 2018-11-09

## 2018-11-20 NOTE — PROGRESS NOTES
Patient: Romain Unegr  : 1975    Primary Care Provider: Carl Mercer MD      Chief Complaint: Continued low back pain    History of Present Illness:       Patient is a 43-year-old gentleman who is well-known to the neurosurgical practice for having a right-sided anterior thigh pain for over a year.  Patient was seen by Dr. Morris and did not believe that any surgery would give him any relief.  Jonathan saw the patient and offered a right-sided L3 4 far lateral discectomy.  Procedure was performed on 2018 patient had a significant relief in his sciatica type symptoms and is doing much better with that.  Patient is very pleased postsurgical outcome, unfortunately he still has a bit of low back pain.  Patient is doing very well with radicular symptoms but the chronic axial pain that he has had for a few years is still present.    Dr. Castillo came the end of the visit and recommended he still back and see Dr. Harman for mitigation of the low back pain.  We will see him back in about 8 weeks with flexion-extension x-rays to rule out any ominous instability of the lumbar spine.  Hopefully patient gets good relief with continued pain management.    Review of Systems   Constitutional: Negative for activity change, appetite change, chills, diaphoresis, fatigue, fever and unexpected weight change.   HENT: Negative for congestion, dental problem, drooling, ear discharge, ear pain, facial swelling, hearing loss, mouth sores, nosebleeds, postnasal drip, rhinorrhea, sinus pressure, sneezing, sore throat, tinnitus, trouble swallowing and voice change.    Eyes: Negative for photophobia, pain, discharge, redness, itching and visual disturbance.   Respiratory: Negative for apnea, cough, choking, chest tightness, shortness of breath, wheezing and stridor.    Cardiovascular: Negative for chest pain, palpitations and leg swelling.   Gastrointestinal: Negative for abdominal distention, abdominal pain, anal bleeding,  blood in stool, constipation, diarrhea, nausea, rectal pain and vomiting.   Endocrine: Negative for cold intolerance, heat intolerance, polydipsia, polyphagia and polyuria.   Genitourinary: Negative for decreased urine volume, difficulty urinating, dysuria, enuresis, flank pain, frequency, genital sores, hematuria and urgency.   Musculoskeletal: Positive for arthralgias, back pain, neck pain and neck stiffness. Negative for gait problem, joint swelling and myalgias.   Skin: Negative for color change, pallor, rash and wound.   Allergic/Immunologic: Negative for environmental allergies, food allergies and immunocompromised state.   Neurological: Positive for weakness and numbness. Negative for dizziness, tremors, seizures, syncope, facial asymmetry, speech difficulty, light-headedness and headaches.   Hematological: Negative for adenopathy. Does not bruise/bleed easily.   Psychiatric/Behavioral: Positive for dysphoric mood and sleep disturbance. Negative for agitation, behavioral problems, confusion, decreased concentration, hallucinations, self-injury and suicidal ideas. The patient is not nervous/anxious and is not hyperactive.        Past Medical History:     Past Medical History:   Diagnosis Date   • Hypertension    • Low back pain        Family History:     Family History   Family history unknown: Yes       Social History:    reports that he has been smoking cigarettes.  He has a 10.00 pack-year smoking history. he has never used smokeless tobacco. He reports that he does not drink alcohol or use drugs.   SMOKING STATUS: I spent greater than 10 minutes educating the patient on smoking cessation, and discussed how smoking pertains to the particular disease process at hand.  The patient acknowledges understanding.      Surgical History:     Past Surgical History:   Procedure Laterality Date   • APPENDECTOMY  2018   • SHOULDER ARTHROSCOPY Left 2015   • SPINAL CORD STIMULATOR IMPLANT  2016    Removed 2018 Dr. Nam  "Ghazal   • SPINAL CORD STIMULATOR REMOVAL         Allergies:   Patient has no known allergies.    Physical Exam:    Vital Signs:/80 (BP Location: Right arm, Patient Position: Standing)   Temp 98 °F (36.7 °C) (Temporal)   Ht 175.3 cm (69.02\")   Wt 107 kg (236 lb)   BMI 34.83 kg/m²    BMI: Body mass index is 34.83 kg/m².    GENERAL:   The patient is in no acute distress, and is able to answer all questions appropriately.  Skin:  The incision is well-healed and well approximated.  No signs of infection, bleeding, or erythema.  Musculoskeletal: The patient’s strength is intact in upper and lower extremities upon direct testing.  Dorsiflexion and plantarflexion are equal bilaterally @ 5/5. Hip flexion against resistance.  The patient’s gait is normal without antalgia.  Neurologic: The patient is alert and oriented by 3.  Recent memory, language, attention span, and fund of knowledge are all with within normal limits.   Sensation is equal bilaterally with no deficit.    Reflexes are 2+ at the patellar and Achilles tendon bilaterally.      Medical Decision Making    Data Review:   New films reviewed at this visit    Diagnosis:   Chronic back pain  Status post right-sided L3 4 far lateral micro-discectomy    Treatment Options:   Patient is going to go back to Dr. Harman for continued pain management.  Patient will see us back in 8 weeks following pain management to check on his back pain.  At that time we will get flexion-extension x-rays to rule out any instability.    It has been a pleasure providing neurosurgical care.    Bunny Garces PA-C       No diagnosis found.  "

## 2018-12-21 ENCOUNTER — HOSPITAL ENCOUNTER (OUTPATIENT)
Dept: GENERAL RADIOLOGY | Facility: HOSPITAL | Age: 43
Discharge: HOME | End: 2018-12-21
Admitting: PHYSICIAN ASSISTANT

## 2018-12-21 DIAGNOSIS — Z98.890 S/P LUMBAR DISCECTOMY: ICD-10-CM

## 2018-12-21 DIAGNOSIS — M51.26 HNP (HERNIATED NUCLEUS PULPOSUS), LUMBAR: ICD-10-CM

## 2018-12-21 PROCEDURE — 72120 X-RAY BEND ONLY L-S SPINE: CPT

## 2019-01-14 ENCOUNTER — OFFICE VISIT (OUTPATIENT)
Dept: NEUROSURGERY | Facility: CLINIC | Age: 44
End: 2019-01-14

## 2019-01-14 VITALS
DIASTOLIC BLOOD PRESSURE: 100 MMHG | RESPIRATION RATE: 17 BRPM | HEIGHT: 69 IN | WEIGHT: 241.6 LBS | SYSTOLIC BLOOD PRESSURE: 140 MMHG | BODY MASS INDEX: 35.78 KG/M2

## 2019-01-14 DIAGNOSIS — Z98.890 S/P LUMBAR DISCECTOMY: Primary | ICD-10-CM

## 2019-01-14 DIAGNOSIS — F32.A DEPRESSION, UNSPECIFIED DEPRESSION TYPE: ICD-10-CM

## 2019-01-14 DIAGNOSIS — M53.9 MULTILEVEL DEGENERATIVE DISC DISEASE: ICD-10-CM

## 2019-01-14 DIAGNOSIS — M51.26 HNP (HERNIATED NUCLEUS PULPOSUS), LUMBAR: ICD-10-CM

## 2019-01-14 PROCEDURE — 99213 OFFICE O/P EST LOW 20 MIN: CPT | Performed by: NEUROLOGICAL SURGERY

## 2019-01-14 NOTE — PROGRESS NOTES
NAME: DEVON HERRMANN   DOS: 2019  : 1975  PCP: Carl Mercer MD    Chief Complaint:  Follow-up right sided L3 4 discectomy  Chief Complaint   Patient presents with   • Back Pain   • S/p R-L3-4 Disc 2018       History of Present Illness:  43 y.o. male   Is a 43-year-old with a history of complex regional pain he presented with right-sided far lateral disc herniation he underwent a successful discectomy and reports significant reduction in his anterior lateral thigh pain.  He still complains of daily back pain he occasionally gets jolts of pain in his right lower extremity and is currently suffering from some depression.    PMHX  Allergies:  No Known Allergies  Medications    Current Outpatient Medications:   •  DULoxetine (CYMBALTA) 60 MG capsule, Take 60 mg by mouth Every Night., Disp: , Rfl:   •  gabapentin (NEURONTIN) 600 MG tablet, , Disp: , Rfl:   •  lisinopril (PRINIVIL,ZESTRIL) 20 MG tablet, , Disp: , Rfl:   •  methocarbamol (ROBAXIN) 500 MG tablet, Take 1.5 tablets by mouth 3 (Three) Times a Day As Needed for Muscle Spasms for up to 90 doses., Disp: 135 tablet, Rfl: 0  •  topiramate (TOPAMAX) 100 MG tablet, Take 100 mg by mouth Every Night., Disp: , Rfl:   Past Medical History:  Past Medical History:   Diagnosis Date   • Hypertension    • Low back pain      Past Surgical History:  Past Surgical History:   Procedure Laterality Date   • APPENDECTOMY  2018   • LUMBAR DISCECTOMY Right 2018    Procedure: LUMBAR MICRO-DISECTOMY RIGHT L3-4 FAR LATERAL;  Surgeon: Beto Castillo MD;  [facility];  Service: Neurosurgery   • SHOULDER ARTHROSCOPY Left    • SPINAL CORD STIMULATOR IMPLANT  2016    Removed 2018 Dr. Cyril Harman   • SPINAL CORD STIMULATOR REMOVAL       Social Hx:  Social History     Tobacco Use   • Smoking status: Current Every Day Smoker     Packs/day: 0.50     Years: 20.00     Pack years: 10.00     Types: Cigarettes   • Smokeless tobacco: Never Used    Substance Use Topics   • Alcohol use: No   • Drug use: No     Family Hx:  Family History   Family history unknown: Yes     Review of Systems:        Review of Systems   Constitutional: Negative for activity change, appetite change, chills, diaphoresis, fatigue, fever and unexpected weight change.   HENT: Negative for congestion, dental problem, drooling, ear discharge, ear pain, facial swelling, hearing loss, mouth sores, nosebleeds, postnasal drip, rhinorrhea, sinus pressure, sneezing, sore throat, tinnitus, trouble swallowing and voice change.    Eyes: Negative for photophobia, pain, discharge, redness, itching and visual disturbance.   Respiratory: Negative for apnea, cough, choking, chest tightness, shortness of breath, wheezing and stridor.    Cardiovascular: Negative for chest pain, palpitations and leg swelling.   Gastrointestinal: Negative for abdominal distention, abdominal pain, anal bleeding, blood in stool, constipation, diarrhea, nausea, rectal pain and vomiting.   Endocrine: Negative for cold intolerance, heat intolerance, polydipsia, polyphagia and polyuria.   Genitourinary: Negative for decreased urine volume, difficulty urinating, dysuria, enuresis, flank pain, frequency, genital sores, hematuria and urgency.   Musculoskeletal: Positive for arthralgias, back pain and neck pain. Negative for gait problem, joint swelling, myalgias and neck stiffness.   Skin: Negative for color change, pallor, rash and wound.   Allergic/Immunologic: Negative for environmental allergies, food allergies and immunocompromised state.   Neurological: Positive for weakness and numbness. Negative for dizziness, tremors, seizures, syncope, facial asymmetry, speech difficulty, light-headedness and headaches.   Hematological: Negative for adenopathy. Does not bruise/bleed easily.   Psychiatric/Behavioral: Positive for sleep disturbance. Negative for agitation, behavioral problems, confusion, decreased concentration, dysphoric  mood, hallucinations, self-injury and suicidal ideas. The patient is not nervous/anxious and is not hyperactive.    All other systems reviewed and are negative.           Physical Examination:  Vitals:    01/14/19 0837   BP: 140/100   Resp: 17      General Appearance:   Well developed, well nourished, well groomed, alert, and cooperative.  Neurological examination:  Neurologic Exam  He has good strength in his lower extremities his incisions well-healed and his gait is normal    Review of Imaging/DATA:  His lumbar flexion extensions show no evidence of listhesis I reviewed his MRI that shows moderate lateral recess stenosis at multiple levels  Diagnoses/Plan:    Mr. Unger is a 43 y.o. male   I don't see a clear-cut surgical plan for him I think he is been suffering from quite a bit of depression in his postoperative recovery his thigh pain is clearly better he says undoubtedly that he would pursue surgery again.    I counseled him about ongoing treatment for depression as well as reasonable expectations I think he may be a candidate for spinal stimulation I'll have him follow-up with Dr. Harman regarding this for a trial at also like him to see pain psychology.  I've also instructed him to follow-up with his primary care doctor regarding a treatment plan regarding his opioid dependency and depression I believe he can get off of those medicines.  And lastly I think he needs to be referred for vocational rehabilitation management given his young age.

## 2020-01-24 ENCOUNTER — TELEPHONE (OUTPATIENT)
Dept: NEUROSURGERY | Facility: CLINIC | Age: 45
End: 2020-01-24

## 2020-02-06 ENCOUNTER — OFFICE VISIT (OUTPATIENT)
Dept: NEUROSURGERY | Facility: CLINIC | Age: 45
End: 2020-02-06

## 2020-02-06 VITALS
SYSTOLIC BLOOD PRESSURE: 142 MMHG | DIASTOLIC BLOOD PRESSURE: 98 MMHG | BODY MASS INDEX: 35.1 KG/M2 | WEIGHT: 237 LBS | HEIGHT: 69 IN

## 2020-02-06 DIAGNOSIS — M54.41 CHRONIC RIGHT-SIDED LOW BACK PAIN WITH RIGHT-SIDED SCIATICA: Primary | ICD-10-CM

## 2020-02-06 DIAGNOSIS — M51.36 DDD (DEGENERATIVE DISC DISEASE), LUMBAR: ICD-10-CM

## 2020-02-06 DIAGNOSIS — G89.29 CHRONIC RIGHT-SIDED LOW BACK PAIN WITH RIGHT-SIDED SCIATICA: Primary | ICD-10-CM

## 2020-02-06 DIAGNOSIS — M54.31 SCIATICA OF RIGHT SIDE: ICD-10-CM

## 2020-02-06 DIAGNOSIS — G89.29 HIP PAIN, CHRONIC, RIGHT: ICD-10-CM

## 2020-02-06 DIAGNOSIS — M25.551 HIP PAIN, CHRONIC, RIGHT: ICD-10-CM

## 2020-02-06 PROBLEM — M54.50 LOW BACK PAIN: Status: ACTIVE | Noted: 2020-02-06

## 2020-02-06 PROCEDURE — 99214 OFFICE O/P EST MOD 30 MIN: CPT | Performed by: PHYSICIAN ASSISTANT

## 2020-02-06 NOTE — PROGRESS NOTES
Office F/U Visit  Subjective   Patient ID: Romain Unger is a 44 y.o. male  [id number]    CC: Low back and right leg pain    History of Present Illness   Patient is a 44-year-old white male who was found to have a lumbar herniated disc at L5 3 4 on the right and underwent a farsighted lateral transpedicular 3 4 lumbar discectomy by Dr. Cruz 9/19/2018.  Was seen back in the office in January 2019 with daily back pain, he would occasionally get jolts of pain into the right leg.  He was treated by Dr. Harman and underwent a spinal stimulator which was subsequently removed.  He is currently having a recurrent right leg pain which he describes as exactly like it was before surgery with right buttocks pain that radiates into the right hip crosses the right anterior thigh to the medial knee.  His pain is worse with standing and walking.  He gets some improvement with sitting and laying.  He is participating with the pain treatment center of the [facility] a week ago he had an injection and 2 days ago he went back for a reinjection per the pain physician.  The patient reports that he is not having any improvement in his pain and that his right leg pain is getting worse.  His symptoms started approximately 4 months ago but are worsening.  He denies any overt weakness but reports that he does favor the right leg when he walks due to pain.  Bladder function is intact.    Past Medical History:   Diagnosis Date   • Hypertension    • Low back pain        No Known Allergies    Current Outpatient Medications:   •  DULoxetine (CYMBALTA) 60 MG capsule, Take 60 mg by mouth Every Night., Disp: , Rfl:   •  gabapentin (NEURONTIN) 600 MG tablet, , Disp: , Rfl:   •  lisinopril (PRINIVIL,ZESTRIL) 20 MG tablet, , Disp: , Rfl:   •  methocarbamol (ROBAXIN) 500 MG tablet, Take 1.5 tablets by mouth 3 (Three) Times a Day As Needed for Muscle Spasms for up to 90 doses., Disp: 135 tablet, Rfl: 0  •  topiramate (TOPAMAX) 100  MG tablet, Take 100 mg by mouth Every Night., Disp: , Rfl:   Social History     Tobacco Use   • Smoking status: Current Every Day Smoker     Packs/day: 0.50     Years: 20.00     Pack years: 10.00     Types: Cigarettes   • Smokeless tobacco: Never Used   Substance Use Topics   • Alcohol use: No   • Drug use: No     Review of Systems   Constitutional: Negative for activity change, appetite change, chills, diaphoresis, fatigue, fever and unexpected weight change.   HENT: Negative for congestion, dental problem, drooling, ear discharge, ear pain, facial swelling, hearing loss, mouth sores, nosebleeds, postnasal drip, rhinorrhea, sinus pressure, sinus pain, sneezing, sore throat, tinnitus, trouble swallowing and voice change.    Eyes: Negative for photophobia, pain, discharge, redness, itching and visual disturbance.   Respiratory: Positive for apnea. Negative for cough, choking, chest tightness, shortness of breath, wheezing and stridor.    Cardiovascular: Negative for chest pain, palpitations and leg swelling.   Gastrointestinal: Positive for diarrhea. Negative for abdominal distention, abdominal pain, anal bleeding, blood in stool, constipation, nausea, rectal pain and vomiting.   Endocrine: Negative for cold intolerance, heat intolerance, polydipsia, polyphagia and polyuria.   Genitourinary: Negative for decreased urine volume, difficulty urinating, discharge, dysuria, enuresis, flank pain, frequency, genital sores, hematuria, penile pain, penile swelling, scrotal swelling, testicular pain and urgency.   Musculoskeletal: Positive for arthralgias, back pain, neck pain and neck stiffness. Negative for gait problem, joint swelling and myalgias.   Skin: Negative for color change, pallor, rash and wound.   Allergic/Immunologic: Negative for environmental allergies, food allergies and immunocompromised state.   Neurological: Positive for numbness and headaches. Negative for dizziness, tremors, seizures, syncope, facial  asymmetry, speech difficulty, weakness and light-headedness.   Hematological: Negative for adenopathy. Does not bruise/bleed easily.   Psychiatric/Behavioral: Positive for dysphoric mood. Negative for agitation, behavioral problems, confusion, decreased concentration, hallucinations, self-injury, sleep disturbance and suicidal ideas. The patient is not nervous/anxious and is not hyperactive.         Physical Exam   Constitutional: He is oriented to person, place, and time.   Neurological: He is oriented to person, place, and time.   Reflex Scores:       Patellar reflexes are 2+ on the right side and 2+ on the left side.       Achilles reflexes are 2+ on the right side and 2+ on the left side.  Psychiatric: His speech is normal.     There were no vitals taken for this visit.    PE:  Well developed well-nourished, in no apparent distress at time of examination.  Awake, alert, oriented, conversant.  Head-normocephalic, atraumatic  EENT-sclera clear, eyelids normal  Lungs-normal expansion, no wheezing  EXT-no edema    Neurologic Exam     Mental Status   Oriented to person, place, and time.   Attention: normal. Concentration: normal.   Speech: speech is normal   Level of consciousness: alert  Knowledge: good.     Motor Exam   Muscle bulk: normal  Overall muscle tone: normal    Sensory Exam   Light touch normal.     Gait, Coordination, and Reflexes     Tremor   Resting tremor: absent  Intention tremor: absent  Action tremor: absent    Reflexes   Right patellar: 2+  Left patellar: 2+  Right achilles: 2+  Left achilles: 2+  Right pendular knee jerk: absent  Left pendular knee jerk: absent     Straight leg raising on the left and internal and external rotation of the hip are negative.  Straight leg raising on the right causes increased right anterior thigh pain internal rotation of the hip on the right causes right hip pain.    Independent Review of Radiographic Studies:   No new studies    Medical Decision Makin.   Degenerative disc disease L3-4 with prior far lateral discectomy in 2018 now with 4 months of recurrent right leg pain which is progressively gotten worse, currently participating in pain injections which have not been of benefit.  He is here for reevaluation by neurosurgery.  I am ordering a new lumbar MRI scan with and without contrast as well as flexion-extension x-rays of the lumbar spine.  He will return to see us in follow-up after this is completed.  2.  Physical deconditioning  3.  BMI 35      Nenita Doris, PAC

## 2020-02-18 ENCOUNTER — HOSPITAL ENCOUNTER (OUTPATIENT)
Dept: MRI IMAGING | Facility: HOSPITAL | Age: 45
End: 2020-02-18

## 2020-03-05 ENCOUNTER — HOSPITAL ENCOUNTER (OUTPATIENT)
Dept: GENERAL RADIOLOGY | Facility: HOSPITAL | Age: 45
Discharge: HOME | End: 2020-03-05

## 2020-03-05 ENCOUNTER — HOSPITAL ENCOUNTER (OUTPATIENT)
Dept: MRI IMAGING | Facility: HOSPITAL | Age: 45
Discharge: HOME | End: 2020-03-05
Admitting: PHYSICIAN ASSISTANT

## 2020-03-05 ENCOUNTER — OFFICE VISIT (OUTPATIENT)
Dept: NEUROSURGERY | Facility: CLINIC | Age: 45
End: 2020-03-05

## 2020-03-05 VITALS
BODY MASS INDEX: 34.07 KG/M2 | SYSTOLIC BLOOD PRESSURE: 142 MMHG | DIASTOLIC BLOOD PRESSURE: 84 MMHG | WEIGHT: 230 LBS | TEMPERATURE: 97.6 F | HEIGHT: 69 IN

## 2020-03-05 DIAGNOSIS — M54.41 CHRONIC RIGHT-SIDED LOW BACK PAIN WITH RIGHT-SIDED SCIATICA: Primary | ICD-10-CM

## 2020-03-05 DIAGNOSIS — Z98.890 S/P LUMBAR DISCECTOMY: ICD-10-CM

## 2020-03-05 DIAGNOSIS — M51.36 DDD (DEGENERATIVE DISC DISEASE), LUMBAR: ICD-10-CM

## 2020-03-05 DIAGNOSIS — G89.29 CHRONIC RIGHT-SIDED LOW BACK PAIN WITH RIGHT-SIDED SCIATICA: Primary | ICD-10-CM

## 2020-03-05 DIAGNOSIS — M54.31 SCIATICA OF RIGHT SIDE: ICD-10-CM

## 2020-03-05 PROCEDURE — 99213 OFFICE O/P EST LOW 20 MIN: CPT | Performed by: PHYSICIAN ASSISTANT

## 2020-03-05 PROCEDURE — 72158 MRI LUMBAR SPINE W/O & W/DYE: CPT

## 2020-03-05 PROCEDURE — 72120 X-RAY BEND ONLY L-S SPINE: CPT

## 2020-03-05 PROCEDURE — A9577 INJ MULTIHANCE: HCPCS | Performed by: PHYSICIAN ASSISTANT

## 2020-03-05 PROCEDURE — 0 GADOBENATE DIMEGLUMINE 529 MG/ML SOLUTION: Performed by: PHYSICIAN ASSISTANT

## 2020-03-05 RX ADMIN — GADOBENATE DIMEGLUMINE 20 ML: 529 INJECTION, SOLUTION INTRAVENOUS at 08:47

## 2020-03-05 NOTE — PROGRESS NOTES
"Subjective     Chief Complaint: back pain and right leg pain    Patient ID: Romain Unger is a 45 y.o. male is here today for follow-up.    History of Present Illness  Patient is a 45-year-old white male who was found to have a lumbar herniated disc at L5 3 4 on the right and underwent a farsighted lateral transpedicular 3 4 lumbar discectomy by Dr. Cruz 9/19/2018. He did well overall, with some intermittent right leg pain over the subsequent year or so.  He had a recurrence of his right leg pain recently and was seen in our office; MRI of the lumbar spine with flexion/extension xrays were performed and he is here today to review these.     He suffers from chronic low back pain that feels like \"someone is driving a fist into my middle back 24/7\". The leg pain occurs intermittently and has responded reasonably well to epidural steroid injections. .  His pain is worse with standing and walking.  He gets some improvement with sitting and laying.  He is participating with the pain treatment center of the [facility] a week ago he had an injection and 2 days ago he went back for a reinjection per the pain physician.  The patient reports that he is not having any improvement in his pain and that his right leg pain is getting worse.  His symptoms started approximately 4 months ago but are worsening.  He denies any overt weakness but reports that he does favor the right leg when he walks due to pain.  Bladder function is intact.    The following portions of the patient's history were reviewed and updated as appropriate: allergies, current medications, past family history, past medical history, past social history, past surgical history and problem list.    Family history:   Family History   Family history unknown: Yes       Social history:   Social History     Socioeconomic History   • Marital status:      Spouse name: Not on file   • Number of children: Not on file   • Years of education: Not " "on file   • Highest education level: Not on file   Tobacco Use   • Smoking status: Current Every Day Smoker     Packs/day: 0.50     Years: 20.00     Pack years: 10.00     Types: Cigarettes   • Smokeless tobacco: Never Used   Substance and Sexual Activity   • Alcohol use: No   • Drug use: No   • Sexual activity: Defer       Review of Systems   Musculoskeletal: Positive for back pain.   All other systems reviewed and are negative.      Objective   Blood pressure 142/84, temperature 97.6 °F (36.4 °C), height 175.3 cm (69\"), weight 104 kg (230 lb).  Body mass index is 33.97 kg/m².    Physical Exam   Constitutional: He is oriented to person, place, and time. He appears well-developed and well-nourished.   HENT:   Head: Normocephalic and atraumatic.   Eyes: Pupils are equal, round, and reactive to light. EOM are normal.   Neck: Normal range of motion.   Pulmonary/Chest: Effort normal.   Abdominal: Soft.   Musculoskeletal: Normal range of motion.   Neurological: He is alert and oriented to person, place, and time. He displays normal reflexes. No cranial nerve deficit or sensory deficit. He exhibits normal muscle tone. Coordination normal.     MRI of the lumbar spine was reviewed today with Dr. Castillo and with the patient as well. There is diffuse mild degenerative disc disease but no evidence of instability or of recurrent or other lumbar disc herniation.     Assessment/Plan   The MRI was reviewed with the patient at length.  Dr. Castillo spoke with him as well and related that there was nothing surgical to be offered at this time.  He had a lengthy discussion with the patient about chronic back pain management and recommends that he be weaned from narcotics and consider either a pain pump or spinal cord stimulator trial again going forward.  The patient is seeing pain treatment center currently and is taking gabapentin and oxycodone 10 mg 3-4 times a day.  He does his stretches daily and is are ready seeing pain specialist " psychology counseling.  The patient mentioned that he would be willing to have a second opinion for his pain management.  Dr. Castillo will have him evaluated by Dr. Rosado to discuss other treatment options.  We will plan to follow up with him on an as-needed basis going forward.     Romain was seen today for follow-up.    Diagnoses and all orders for this visit:    Chronic right-sided low back pain with right-sided sciatica  -     Ambulatory Referral to Pain Management    DDD (degenerative disc disease), lumbar  -     Ambulatory Referral to Pain Management    S/P lumbar discectomy  -     Ambulatory Referral to Pain Management    Sciatica of right side        Return if symptoms worsen or fail to improve.

## (undated) DEVICE — SPNG GZ WOVN 4X4IN 12PLY 10/BX STRL

## (undated) DEVICE — TUBING, SUCTION, 1/4" X 10', STRAIGHT: Brand: MEDLINE

## (undated) DEVICE — ANTIBACTERIAL UNDYED BRAIDED (POLYGLACTIN 910), SYNTHETIC ABSORBABLE SUTURE: Brand: COATED VICRYL

## (undated) DEVICE — NDL SPINE 20G 3 1/2 YEL STRL 1P/U

## (undated) DEVICE — MAGNETIC DRAPE: Brand: DEVON

## (undated) DEVICE — PAD GRND REM POLYHESIVE A/ DISP

## (undated) DEVICE — RUBBERBAND LF STRL PK/2

## (undated) DEVICE — CANNULA,OXY,ADULT,SUPERSOFT,W/7'TUB,UC: Brand: MEDLINE

## (undated) DEVICE — PK NEURO DISC 10

## (undated) DEVICE — HDRST INTUB GENTLETOUCH SLOT 7IN RT

## (undated) DEVICE — 3M™ STERI-STRIP™ REINFORCED ADHESIVE SKIN CLOSURES, R1547, 1/2 IN X 4 IN (12 MM X 100 MM), 6 STRIPS/ENVELOPE: Brand: 3M™ STERI-STRIP™

## (undated) DEVICE — PAD ARMBRD SURG CONVOL 7.5X20X2IN

## (undated) DEVICE — NDL HYPO ECLPS SFTY 25G 1 1/2IN

## (undated) DEVICE — APPL CHLORAPREP W/TINT 26ML BLU

## (undated) DEVICE — MEDI-VAC NON-CONDUCTIVE SUCTION TUBING: Brand: CARDINAL HEALTH

## (undated) DEVICE — ELECTRD BLD EXT EDGE/INSUL 6IN

## (undated) DEVICE — ENCORE® LATEX MICRO SIZE 8, STERILE LATEX POWDER-FREE SURGICAL GLOVE: Brand: ENCORE

## (undated) DEVICE — GLV SURG BIOGEL LTX PF 8

## (undated) DEVICE — ELECTRD BLD EDGE/INSUL1P SFTY SLV 2.75IN

## (undated) DEVICE — C-ARM: Brand: UNBRANDED

## (undated) DEVICE — NDL SPINE 22G 31/2IN BLK

## (undated) DEVICE — SUT VIC 0 UR6 27IN VCP603H

## (undated) DEVICE — DRSNG WND GZ PAD BORDERED 4X8IN STRL

## (undated) DEVICE — 3M™ STERI-DRAPE™ INSTRUMENT POUCH 1018: Brand: STERI-DRAPE™

## (undated) DEVICE — MEDI-VAC YANKAUER SUCTION HANDLE W/BULBOUS TIP: Brand: CARDINAL HEALTH

## (undated) DEVICE — TOOL T12MH25M LEGEND 12CM 2.5MM MH 2FLT: Brand: MIDAS REX ™

## (undated) DEVICE — ULTRACLEAN ACCESSORY ELECTRODE 6" (15.24 CM) COATED BLADE: Brand: ULTRACLEAN

## (undated) DEVICE — ADHS LIQ MASTISOL 2/3ML

## (undated) DEVICE — SNAP KOVER: Brand: UNBRANDED

## (undated) DEVICE — SYR CONTRL LUERLOK 10CC

## (undated) DEVICE — DRP MICROSCOP ELIMINATES GLAS COVR

## (undated) DEVICE — COVER,LIGHT HANDLE,FLX,1/PK: Brand: MEDLINE INDUSTRIES, INC.